# Patient Record
Sex: MALE | URBAN - METROPOLITAN AREA
[De-identification: names, ages, dates, MRNs, and addresses within clinical notes are randomized per-mention and may not be internally consistent; named-entity substitution may affect disease eponyms.]

---

## 2021-07-01 ENCOUNTER — APPOINTMENT (RX ONLY)
Dept: URBAN - METROPOLITAN AREA CLINIC 2 | Facility: CLINIC | Age: 51
Setting detail: DERMATOLOGY
End: 2021-07-01

## 2021-07-01 PROBLEM — D29.4 BENIGN NEOPLASM OF SCROTUM: Status: ACTIVE | Noted: 2021-07-01

## 2021-07-01 PROCEDURE — ? TREATMENT REGIMEN

## 2021-07-01 PROCEDURE — ? COUNSELING

## 2021-07-01 PROCEDURE — 99202 OFFICE O/P NEW SF 15 MIN: CPT

## 2021-07-01 NOTE — HPI: GENITAL WARTS (CONDYLOMA ACCUMINATUM)
----- Message from Gabrielle Kirby MD sent at 3/5/2021  6:30 AM CST -----  Iron def anemia- sent rx for iron  Low vit d- sent rx for vit d  Ok to let pt know  
How Severe Are Your Genital Warts?.: mild
LM to call office. Lab results.  
Is This A New Presentation, Or A Follow-Up?: Warts
How Many Partners: 1